# Patient Record
Sex: FEMALE | URBAN - METROPOLITAN AREA
[De-identification: names, ages, dates, MRNs, and addresses within clinical notes are randomized per-mention and may not be internally consistent; named-entity substitution may affect disease eponyms.]

---

## 2017-02-09 ENCOUNTER — TELEPHONE (OUTPATIENT)
Dept: FAMILY MEDICINE | Facility: CLINIC | Age: 52
End: 2017-02-09

## 2017-02-09 NOTE — TELEPHONE ENCOUNTER
----- Message from Annetta Curtis sent at 2/9/2017  7:36 AM CST -----  Contact: self  Patient called asking for advice about medication for yeast infection. Patient has been on [phone]  Please call in to [facility] at [phone]. Please call patient at [phone]. Thanks!